# Patient Record
Sex: FEMALE | Race: WHITE
[De-identification: names, ages, dates, MRNs, and addresses within clinical notes are randomized per-mention and may not be internally consistent; named-entity substitution may affect disease eponyms.]

---

## 2018-07-05 ENCOUNTER — HOSPITAL ENCOUNTER (OUTPATIENT)
Dept: HOSPITAL 75 - RAD | Age: 28
End: 2018-07-05
Attending: NURSE PRACTITIONER
Payer: COMMERCIAL

## 2018-07-05 DIAGNOSIS — N94.10: Primary | ICD-10-CM

## 2018-07-05 DIAGNOSIS — R10.2: ICD-10-CM

## 2018-07-05 PROCEDURE — 76830 TRANSVAGINAL US NON-OB: CPT

## 2018-07-05 PROCEDURE — 76856 US EXAM PELVIC COMPLETE: CPT

## 2018-07-05 NOTE — DIAGNOSTIC IMAGING REPORT
PROCEDURE: US Non-ob pelvis comp/trans.



TECHNIQUE:  Multiple realtime grayscale images were obtained of

the pelvis in various projections endovaginally.



Transabdominal imaging was also performed.



INDICATION: Dyspareunia and pelvic pain.



The uterus measures 6.1 x 4.7 x 3.8 cm. The endometrium is 5 mm

in thickness. No uterine mass is detected. The right ovary

measures 4.6 x 2.3 x 1.8 cm and the left ovary measures 3.5 x 2.5

x 1.3 cm. Both ovaries contain multiple follicles. Largest on the

right measures 13 mm. There is blood flow to both ovaries. No

adnexal mass or free fluid is seen.



IMPRESSION: Essentially unremarkable transabdominal and

transvaginal pelvic ultrasound.



Dictated by: 



  Dictated on workstation # [id number]

## 2019-06-06 ENCOUNTER — HOSPITAL ENCOUNTER (OUTPATIENT)
Dept: HOSPITAL 75 - CARD | Age: 29
End: 2019-06-06
Attending: SURGERY
Payer: COMMERCIAL

## 2019-06-06 DIAGNOSIS — R10.13: Primary | ICD-10-CM

## 2019-06-06 DIAGNOSIS — R11.2: ICD-10-CM

## 2019-06-06 PROCEDURE — 78227 HEPATOBIL SYST IMAGE W/DRUG: CPT

## 2019-06-06 NOTE — DIAGNOSTIC IMAGING REPORT
INDICATION: Epigastric and abdominal pain.



COMPARISON: None.



Tc-99m Choletec  4.97 mCi IV followed by 8 ounces of oral Ensure.



FINDINGS: The upper abdomen was imaged for 60 minutes with the

gamma camera.  There is normal appearance of activity in the

liver. There is activity in the common duct and gallbladder by 60

minutes.  



After 60 minutes, the patient received 8 ounces of oral Ensure.

After 60 minutes of additional imaging, the gallbladder ejection

fraction was calculated to be 47% which is normal.



IMPRESSION: Normal hepatobiliary study with GBEF of 47%.



Dictated by: 



  Dictated on workstation # [id number]

## 2019-06-17 ENCOUNTER — HOSPITAL ENCOUNTER (OUTPATIENT)
Dept: HOSPITAL 75 - PREOP | Age: 29
Discharge: HOME | End: 2019-06-17
Attending: SURGERY
Payer: COMMERCIAL

## 2019-06-17 VITALS — HEIGHT: 64 IN | BODY MASS INDEX: 19.81 KG/M2 | WEIGHT: 116 LBS

## 2019-06-17 DIAGNOSIS — Z01.818: Primary | ICD-10-CM

## 2022-02-28 ENCOUNTER — HOSPITAL ENCOUNTER (EMERGENCY)
Dept: HOSPITAL 75 - ER | Age: 32
Discharge: HOME | End: 2022-02-28
Payer: COMMERCIAL

## 2022-02-28 VITALS — DIASTOLIC BLOOD PRESSURE: 64 MMHG | SYSTOLIC BLOOD PRESSURE: 115 MMHG

## 2022-02-28 VITALS — WEIGHT: 114.64 LBS | HEIGHT: 63.78 IN | BODY MASS INDEX: 19.81 KG/M2

## 2022-02-28 DIAGNOSIS — N20.1: Primary | ICD-10-CM

## 2022-02-28 LAB
APTT PPP: YELLOW S
BACTERIA #/AREA URNS HPF: NEGATIVE /HPF
BASOPHILS # BLD AUTO: 0 10^3/UL (ref 0–0.1)
BASOPHILS NFR BLD AUTO: 0 % (ref 0–10)
BILIRUB UR QL STRIP: NEGATIVE
BUN/CREAT SERPL: 8
CALCIUM SERPL-MCNC: 9.3 MG/DL (ref 8.5–10.1)
CHLORIDE SERPL-SCNC: 107 MMOL/L (ref 98–107)
CO2 SERPL-SCNC: 17 MMOL/L (ref 21–32)
CREAT SERPL-MCNC: 0.86 MG/DL (ref 0.6–1.3)
EOSINOPHIL # BLD AUTO: 0.1 10^3/UL (ref 0–0.3)
EOSINOPHIL NFR BLD AUTO: 1 % (ref 0–10)
FIBRINOGEN PPP-MCNC: CLEAR MG/DL
GFR SERPLBLD BASED ON 1.73 SQ M-ARVRAT: 94 ML/MIN
GLUCOSE SERPL-MCNC: 125 MG/DL (ref 70–105)
GLUCOSE UR STRIP-MCNC: NEGATIVE MG/DL
HCT VFR BLD CALC: 40 % (ref 35–52)
HGB BLD-MCNC: 13.3 G/DL (ref 11.5–16)
KETONES UR QL STRIP: (no result)
LEUKOCYTE ESTERASE UR QL STRIP: (no result)
LYMPHOCYTES # BLD AUTO: 2.4 10^3/UL (ref 1–4)
LYMPHOCYTES NFR BLD AUTO: 41 % (ref 12–44)
MANUAL DIFFERENTIAL PERFORMED BLD QL: NO
MCH RBC QN AUTO: 30 PG (ref 25–34)
MCHC RBC AUTO-ENTMCNC: 33 G/DL (ref 32–36)
MCV RBC AUTO: 89 FL (ref 80–99)
MONOCYTES # BLD AUTO: 0.4 10^3/UL (ref 0–1)
MONOCYTES NFR BLD AUTO: 6 % (ref 0–12)
NEUTROPHILS # BLD AUTO: 3.1 10^3/UL (ref 1.8–7.8)
NEUTROPHILS NFR BLD AUTO: 52 % (ref 42–75)
NITRITE UR QL STRIP: NEGATIVE
PH UR STRIP: 7 [PH] (ref 5–9)
PLATELET # BLD: 304 10^3/UL (ref 130–400)
PMV BLD AUTO: 9.9 FL (ref 9–12.2)
POTASSIUM SERPL-SCNC: 4.3 MMOL/L (ref 3.6–5)
PROT UR QL STRIP: (no result)
SODIUM SERPL-SCNC: 136 MMOL/L (ref 135–145)
SP GR UR STRIP: 1.02 (ref 1.02–1.02)
SQUAMOUS #/AREA URNS HPF: (no result) /HPF
WBC # BLD AUTO: 6 10^3/UL (ref 4.3–11)
WBC #/AREA URNS HPF: (no result) /HPF

## 2022-02-28 PROCEDURE — 80048 BASIC METABOLIC PNL TOTAL CA: CPT

## 2022-02-28 PROCEDURE — 74176 CT ABD & PELVIS W/O CONTRAST: CPT

## 2022-02-28 PROCEDURE — 96374 THER/PROPH/DIAG INJ IV PUSH: CPT

## 2022-02-28 PROCEDURE — 81000 URINALYSIS NONAUTO W/SCOPE: CPT

## 2022-02-28 PROCEDURE — 87591 N.GONORRHOEAE DNA AMP PROB: CPT

## 2022-02-28 PROCEDURE — 84703 CHORIONIC GONADOTROPIN ASSAY: CPT

## 2022-02-28 PROCEDURE — 36415 COLL VENOUS BLD VENIPUNCTURE: CPT

## 2022-02-28 PROCEDURE — 74018 RADEX ABDOMEN 1 VIEW: CPT

## 2022-02-28 PROCEDURE — 87491 CHLMYD TRACH DNA AMP PROBE: CPT

## 2022-02-28 PROCEDURE — 96375 TX/PRO/DX INJ NEW DRUG ADDON: CPT

## 2022-02-28 PROCEDURE — 85025 COMPLETE CBC W/AUTO DIFF WBC: CPT

## 2022-02-28 NOTE — ED ABDOMINAL PAIN
General


Chief Complaint:  Back Problems


Stated Complaint:  FEVER / CHILLS / N/V / BACK PAIN


Source of Information:  Patient


Exam Limitations:  No Limitations





History of Present Illness


Date Seen by Provider:  2022


Time Seen by Provider:  08:50


Initial Comments


Patient is a 31-year-old female who presents to the emergency department with a 

chief complaint of right flank pain, right mid abdominal pain, nausea vomiting, 

subjective fever onset 2 days ago on Saturday.  Patient woke up with her 

symptoms, went to urgent care and was told that she did not have a urinary tract

infection however they started her on some Macrobid.  They also tested her for 

bacterial vaginosis which she states she did not have.  Patient states her 

symptoms actually got better throughout Saturday evening but returned yesterday 

evening and have been severe.  She has had persistent nausea and vomiting with 

radiation of the pain from the right flank down into her groin and vaginal area.

 She denies abnormal vaginal discharge.  Recently finished up her menstrual 

cycle in the last couple of days.  Is on birth control.  No prior history of 

kidney stone.  Has had previous appendectomy.  No diarrhea.  Last bowel movement

was yesterday.  No black or bloody stools.





All other review of systems reviewed and negative except as stated.


Timing/Duration:  1-2 Days


Severity/Quality:  Aching, Cramping


Location:  RUQ, RLQ, Flank (right)


Radiation:  Groin


Associated Symptoms:  Nausea/Vomiting





Allergies and Home Medications


Allergies


Coded Allergies:  


     No Known Drug Allergies (Unverified , 14)





Patient Home Medication List


Home Medication List Reviewed:  Yes


Hydrocodone/Acetaminophen (Hydrocodone-Acetamin 5-325 mg) 1 Each Tablet, 1 TAB 

PO Q6H PRN for PAIN-MODERATE (5-7)


   Prescribed by: LISA ESQUEDA on 22 1031


Melatonin/Pyridoxine (Melatonin 5 mg Tablet) 1 Each Tablet, 5 MG PO HS, 

(Reported)


   Entered as Reported by: HOWARD REAGAN on 19 1316


Ondansetron (Ondansetron Odt) 4 Mg Tab.rapdis, 4 MG PO Q8H PRN for nausea


   Prescribed by: LISA ESQUEDA on 22 1030


Pantoprazole Sodium (Protonix) 40 Mg Tablet.dr, 40 MG PO DAILY


   Prescribed by: HARDEEP SELBY on 19 1559


Tamsulosin HCl (Flomax) 0.4 Mg Cap, 0.4 MG PO DAILY


   Prescribed by: LISA ESQUEDA on 22 1030


[bcp]  , (Reported)


   Entered as Reported by: SHALA BARAHONA on 16 0047





Review of Systems


Review of Systems


Constitutional:  see HPI


EENTM:  No Symptoms Reported


Respiratory:  No Symptoms Reported


Cardiovascular:  No Symptoms Reported


Gastrointestinal:  Abdominal Pain, Nausea, Vomiting


Genitourinary:  Pain (vaginal pain (had pain about 1 week ago as well on 

wednesday after intercourse))


Musculoskeletal:  no symptoms reported


Skin:  no symptoms reported


Psychiatric/Neurological:  No Symptoms Reported





All Other Systems Reviewed


Negative Unless Noted:  Yes





Past Medical-Social-Family Hx


Immunizations Up To Date


Tetanus Booster (TDap):  Unknown





Seasonal Allergies


Seasonal Allergies:  No





Past Medical History


Surgeries:  Yes (L KNEE ARTHOSCOPY)


Appendectomy, Tonsillectomy


Respiratory:  No


Cardiac:  No


Neurological:  No


Reproductive Disorders:  No


Genitourinary:  No


Gastrointestinal:  Yes


Gastroesophageal Reflux


Musculoskeletal:  No


Endocrine:  No


HEENT:  No


Cancer:  No


Psychosocial:  No


Integumentary:  No


Blood Disorders:  No





Family Medical History





Patient reports no known family medical history.





Physical Exam


Vital Signs





Vital Signs - First Documented








 22





 08:50


 


Temp 36.0


 


Pulse 88


 


Resp 16


 


B/P (MAP) 140/116 (124)


 


Pulse Ox 100





Capillary Refill :


Height/Weight/BMI


Height: 5'4.00"


Weight: 116lbs. 0.0oz. 52.850509st; 19.9 BMI


Method:Stated


General Appearance:  WD/WN, no apparent distress


HEENT:  PERRL/EOMI


Respiratory:  lungs clear, normal breath sounds, no respiratory distress, no 

accessory muscle use


Cardiovascular:  regular rate, rhythm


Gastrointestinal:  soft, tenderness (right flank, mild right CVA tenderness; no 

rebound/Pedraza's; no involuntary guarding)


Extremities:  normal range of motion, non-tender, normal inspection, no pedal 

edema


Back:  CVA tenderness (R)


Neurologic/Psychiatric:  alert, normal mood/affect, oriented x 3


Skin:  normal color, warm/dry





Progress/Results/Core Measures


Results/Orders


Lab Results





Laboratory Tests








Test


 22


08:59 22


09:05 Range/Units


 


 


White Blood Count


 6.0 


 


 4.3-11.0


10^3/uL


 


Red Blood Count


 4.50 


 


 3.80-5.11


10^6/uL


 


Hemoglobin 13.3   11.5-16.0  g/dL


 


Hematocrit 40   35-52  %


 


Mean Corpuscular Volume 89   80-99  fL


 


Mean Corpuscular Hemoglobin 30   25-34  pg


 


Mean Corpuscular Hemoglobin


Concent 33 


 


 32-36  g/dL





 


Red Cell Distribution Width 13.5   10.0-14.5  %


 


Platelet Count


 304 


 


 130-400


10^3/uL


 


Mean Platelet Volume 9.9   9.0-12.2  fL


 


Immature Granulocyte % (Auto) 0    %


 


Neutrophils (%) (Auto) 52   42-75  %


 


Lymphocytes (%) (Auto) 41   12-44  %


 


Monocytes (%) (Auto) 6   0-12  %


 


Eosinophils (%) (Auto) 1   0-10  %


 


Basophils (%) (Auto) 0   0-10  %


 


Neutrophils # (Auto)


 3.1 


 


 1.8-7.8


10^3/uL


 


Lymphocytes # (Auto)


 2.4 


 


 1.0-4.0


10^3/uL


 


Monocytes # (Auto)


 0.4 


 


 0.0-1.0


10^3/uL


 


Eosinophils # (Auto)


 0.1 


 


 0.0-0.3


10^3/uL


 


Basophils # (Auto)


 0.0 


 


 0.0-0.1


10^3/uL


 


Immature Granulocyte # (Auto)


 0.0 


 


 0.0-0.1


10^3/uL


 


Urine Color YELLOW    


 


Urine Clarity CLEAR    


 


Urine pH 7.0   5-9  


 


Urine Specific Gravity 1.020   1.016-1.022  


 


Urine Protein TRACE H  NEGATIVE  


 


Urine Glucose (UA) NEGATIVE   NEGATIVE  


 


Urine Ketones 1+ H  NEGATIVE  


 


Urine Nitrite NEGATIVE   NEGATIVE  


 


Urine Bilirubin NEGATIVE   NEGATIVE  


 


Urine Urobilinogen 0.2   < = 1.0  MG/DL


 


Urine Leukocyte Esterase TRACE H  NEGATIVE  


 


Urine RBC (Auto) 2+ H  NEGATIVE  


 


Urine RBC 10-25 H   /HPF


 


Urine WBC RARE    /HPF


 


Urine Squamous Epithelial


Cells 2-5 


 


  /HPF





 


Urine Crystals NONE    /LPF


 


Urine Bacteria NEGATIVE    /HPF


 


Urine Casts NONE    /LPF


 


Urine Mucus SMALL H   /LPF


 


Urine Culture Indicated NO    


 


Sodium Level 136   135-145  MMOL/L


 


Potassium Level 4.3   3.6-5.0  MMOL/L


 


Chloride Level 107     MMOL/L


 


Carbon Dioxide Level 17 L  21-32  MMOL/L


 


Anion Gap 12   5-14  MMOL/L


 


Blood Urea Nitrogen 7   7-18  MG/DL


 


Creatinine


 0.86 


 


 0.60-1.30


MG/DL


 


Estimat Glomerular Filtration


Rate 94 


 


  





 


BUN/Creatinine Ratio 8    


 


Glucose Level 125 H    MG/DL


 


Calcium Level 9.3   8.5-10.1  MG/DL








My Orders





Orders - LISA ESQUEDA MD


Ed Iv/Invasive Line Start (22 08:56)


Cbc With Automated Diff (22 08:56)


Basic Metabolic Panel (22 08:56)


Urine Pregnancy Bedside (22 08:56)


Ua Culture If Indicated (22 08:56)


Abdomen/Kub 1view (22 08:56)


Ct Abd/Pelvis Wo(Kidney Stone) (22 08:56)


Ketorolac Injection (Toradol Injection) (22 09:00)


Neis Luis Alfredo Dna Urine Test (22 09:01)


Chlamydia Trachomatis Urine (22 09:01)


Ondansetron Injection (Zofran Injectio (22 09:30)





Medications Given in ED





Current Medications








 Medications  Dose


 Ordered  Sig/Vikas


 Route  Start Time


 Stop Time Status Last Admin


Dose Admin


 


 Ketorolac


 Tromethamine  15 mg  ONCE  ONCE


 IVP  22 09:00


 22 09:01 DC 22 09:12


15 MG


 


 Ondansetron HCl  8 mg  ONCE  ONCE


 IVP  22 09:30


 22 09:31 DC 22 09:21


8 MG








Vital Signs/I&O











 22





 08:50


 


Temp 36.0


 


Pulse 88


 


Resp 16


 


B/P (MAP) 140/116 (124)


 


Pulse Ox 100











Progress


Progress Note :  


   Time:  10:27


Progress Note


Patient states she feels much better after IV Toradol.  She has a 3 mm distal 

right ureteral stone.  She does not have evidence of urinary tract infection on 

urinalysis.  Renal function is normal.  I did recommend the patient complete her

 course of antibiotics.  I have given her return precautions.  She verbalized 

understanding, is comfortable with the plan of care.  All questions have been 

sought and answered.





Diagnostic Imaging





   Diagonstic Imaging:  Xray


Comments


                 [facility] [place], [place]





NAME:   SCOTT HEDRICK


MED REC#:   [id number]


ACCOUNT#:   [id number]


PT STATUS:   REG ER


:   1990


PHYSICIAN:   LISA ESQUEDA MD


ADMIT DATE:   22/ER


                                   ***Draft***


Date of Exam:22





ABDOMEN/KUB 1VIEW








EXAMINATION: Abdomen 1 view





HISTORY: Right flank pain





COMPARISON: 2022





FINDINGS: 





There is a moderate amount of gas and stool throughout the colon.


Nonobstructive bowel gas pattern. Opacity within the left pelvis


measuring up to 0.3 cm. The osseous structures are intact.





IMPRESSION:





No acute radiographic abnormality in the visualized abdomen. A


0.3 cm opacity within the left pelvis which could represent stone


or phlebolith.





  Dictated on workstation # [id number]








Dict:   22 1002


Trans:   22 1004


CVB [id number]





Interpreted by:     APARNA GALLEGOS DO


Electronically signed by:








Comments


                 [facility] [place], [place]





NAME:   SCOTT HEDRICK


MED REC#:   [id number]


ACCOUNT#:   [id number]


PT STATUS:   REG ER


:   1990


PHYSICIAN:   LISA ESQUEDA MD


ADMIT DATE:   22/ER


                                   ***Draft***


Date of Exam:22





CT ABD/PELVIS WO(KIDNEY STONE)








INDICATION:  


Low abdominal pain.





TECHNIQUE: 


Noncontrast CT imaging of the abdomen and pelvis was performed.


Comparison is made to the study of 2016.





FINDINGS:


The unenhanced images of the liver, gallbladder, pancreas,


adrenal glands, and spleen are unremarkable. There are numerous


punctate nonobstructing stones in the kidneys bilaterally. There


is mild prominence of the right renal collecting system and


ureter to the level of an approximately 0.3 cm stone in the


distal right ureter. No bladder calculus is identified. There is


no evidence of free fluid or organized inflammation. The appendix


is surgically absent.





IMPRESSION: 


Bilateral nephrolithiasis with an approximately 0.3 cm at least


partially obstructing calculus in the distal right ureter.














  Dictated on workstation # [id number]








Dict:   22 0955


Trans:   22 1008


JM [id number]





Interpreted by:     BLAINE GASTON MD


Electronically signed by:





Departure


Impression





   Primary Impression:  


   Ureterolithiasis


Disposition:  01 HOME, SELF-CARE


Condition:  Improved





Departure-Patient Inst.


Decision time for Depature:  10:28


Referrals:  


[facility] (PCP/Family)


Primary Care Physician








TAWIL,ELIAS A MD


Patient Instructions:  Kidney Stone, Adult ED





Add. Discharge Instructions:  


Drink plenty of fluids to stay well-hydrated.





Strain your urine until you note that you have passed your stone.





You can continue to take over-the-counter ibuprofen, 3 tablets which is 600 mg 

every 6 hours as needed for pain.  Or you can take Alleve 2 tablets in the 

morning with food and 2 tablets in the evening with food as needed for pain.





I have written you a prescription for hydrocodone.  You can take 1 of these 

every 6 hours as needed for pain.  Hydrocodone is a narcotic, you should not 

drive and take this medication.  Narcotics can cause constipation.  While you 

are taking them you should be on a stool softener daily.





I have sent you a prescription for nausea medicine that you can take every 8 

hours.





I have also sent you a prescription for Flomax which will help with urinary 

flow.





Please finish up your antibiotic prescription.





Come back to the emergency room for any fever, worsening pain or new emergent 

concerning symptoms.





I have also given you contact information for our urologist on-call, Dr. Tawil. 

 You can call his office for follow-up regarding further evaluation and man

agement of kidney stones.


Scripts


Ondansetron (Ondansetron Odt) 4 Mg Tab.rapdis


4 MG PO Q8H PRN for nausea, #15 TAB


   Prov: LISA ESQUEDA MD         22 


Hydrocodone/Acetaminophen (Hydrocodone-Acetamin 5-325 mg) 1 Each Tablet


1 TAB PO Q6H PRN for PAIN-MODERATE (5-7), #12 TAB


   Prov: LISA ESQUEDA MD         22 


Tamsulosin HCl (Flomax) 0.4 Mg Cap


0.4 MG PO DAILY for 14 Days, #14 CAP


   Prov: LISA ESQUEDA MD         22











LISA ESQUEDA MD         2022 09:01

## 2022-02-28 NOTE — DIAGNOSTIC IMAGING REPORT
EXAMINATION: Abdomen 1 view



HISTORY: Right flank pain



COMPARISON: 02/28/2022



FINDINGS: 



There is a moderate amount of gas and stool throughout the colon.

Nonobstructive bowel gas pattern. Opacity within the left pelvis

measuring up to 0.3 cm. The osseous structures are intact.



IMPRESSION:



No acute radiographic abnormality in the visualized abdomen. A

0.3 cm opacity within the left pelvis which could represent stone

or phlebolith.



Dictated by: 



  Dictated on workstation # [id number]

## 2022-02-28 NOTE — DIAGNOSTIC IMAGING REPORT
INDICATION:  

Low abdominal pain.



TECHNIQUE: 

Noncontrast CT imaging of the abdomen and pelvis was performed.

Comparison is made to the study of 11/02/2016.



FINDINGS:

The unenhanced images of the liver, gallbladder, pancreas,

adrenal glands, and spleen are unremarkable. There are numerous

punctate nonobstructing stones in the kidneys bilaterally. There

is mild prominence of the right renal collecting system and

ureter to the level of an approximately 0.3 cm stone in the

distal right ureter. No bladder calculus is identified. There is

no evidence of free fluid or organized inflammation. The appendix

is surgically absent.



IMPRESSION: 

Bilateral nephrolithiasis with an approximately 0.3 cm at least

partially obstructing calculus in the distal right ureter.







Dictated by: 



  Dictated on workstation # [id number]

## 2022-03-01 ENCOUNTER — HOSPITAL ENCOUNTER (OUTPATIENT)
Dept: HOSPITAL 75 - RAD | Age: 32
End: 2022-03-01
Attending: UROLOGY
Payer: COMMERCIAL

## 2022-03-01 DIAGNOSIS — N20.0: Primary | ICD-10-CM

## 2022-03-01 PROCEDURE — 74018 RADEX ABDOMEN 1 VIEW: CPT

## 2022-03-01 NOTE — DIAGNOSTIC IMAGING REPORT
INDICATION: Right ureteral stone.



TIME OF EXAM: 01:27 p.m.



COMPARISON: Correlation is made with prior radiograph from

earlier same day.



FINDINGS: The tiny stone in the region of the distal right ureter

is not well visualized by plain film radiographs. Punctate

densities are noted overlying the right renal shadow, consistent

with nephrolithiasis. Left renal shadow is somewhat obscured by

bowel gas. No definite calculi along the course of ureters are

seen.



IMPRESSION: Right-sided renal calculi. No definite calculi within

the course of ureters are identified.



Dictated by: 



  Dictated on workstation # [id number]

## 2022-03-07 ENCOUNTER — HOSPITAL ENCOUNTER (OUTPATIENT)
Dept: HOSPITAL 75 - PREOP | Age: 32
Discharge: HOME | End: 2022-03-07
Attending: UROLOGY
Payer: COMMERCIAL

## 2022-03-07 VITALS — HEIGHT: 64.02 IN | WEIGHT: 115.3 LBS | BODY MASS INDEX: 19.68 KG/M2

## 2022-03-07 DIAGNOSIS — Z01.818: Primary | ICD-10-CM

## 2022-03-08 ENCOUNTER — HOSPITAL ENCOUNTER (OUTPATIENT)
Dept: HOSPITAL 75 - SDC | Age: 32
Discharge: HOME | End: 2022-03-08
Attending: UROLOGY
Payer: COMMERCIAL

## 2022-03-08 VITALS — WEIGHT: 115.3 LBS | HEIGHT: 63.78 IN | BODY MASS INDEX: 19.93 KG/M2

## 2022-03-08 VITALS — SYSTOLIC BLOOD PRESSURE: 112 MMHG | DIASTOLIC BLOOD PRESSURE: 76 MMHG

## 2022-03-08 VITALS — DIASTOLIC BLOOD PRESSURE: 74 MMHG | SYSTOLIC BLOOD PRESSURE: 109 MMHG

## 2022-03-08 VITALS — SYSTOLIC BLOOD PRESSURE: 114 MMHG | DIASTOLIC BLOOD PRESSURE: 81 MMHG

## 2022-03-08 VITALS — SYSTOLIC BLOOD PRESSURE: 119 MMHG | DIASTOLIC BLOOD PRESSURE: 78 MMHG

## 2022-03-08 VITALS — DIASTOLIC BLOOD PRESSURE: 67 MMHG | SYSTOLIC BLOOD PRESSURE: 113 MMHG

## 2022-03-08 VITALS — DIASTOLIC BLOOD PRESSURE: 74 MMHG | SYSTOLIC BLOOD PRESSURE: 123 MMHG

## 2022-03-08 VITALS — DIASTOLIC BLOOD PRESSURE: 74 MMHG | SYSTOLIC BLOOD PRESSURE: 112 MMHG

## 2022-03-08 VITALS — DIASTOLIC BLOOD PRESSURE: 79 MMHG | SYSTOLIC BLOOD PRESSURE: 121 MMHG

## 2022-03-08 VITALS — SYSTOLIC BLOOD PRESSURE: 116 MMHG | DIASTOLIC BLOOD PRESSURE: 73 MMHG

## 2022-03-08 DIAGNOSIS — N20.2: Primary | ICD-10-CM

## 2022-03-08 DIAGNOSIS — F17.210: ICD-10-CM

## 2022-03-08 PROCEDURE — 76000 FLUOROSCOPY <1 HR PHYS/QHP: CPT

## 2022-03-08 PROCEDURE — 74018 RADEX ABDOMEN 1 VIEW: CPT

## 2022-03-08 PROCEDURE — 84703 CHORIONIC GONADOTROPIN ASSAY: CPT

## 2022-03-08 PROCEDURE — 87081 CULTURE SCREEN ONLY: CPT

## 2022-03-08 RX ADMIN — SODIUM CHLORIDE, SODIUM LACTATE, POTASSIUM CHLORIDE, AND CALCIUM CHLORIDE PRN MLS/HR: 600; 310; 30; 20 INJECTION, SOLUTION INTRAVENOUS at 06:50

## 2022-03-08 RX ADMIN — SODIUM CHLORIDE, SODIUM LACTATE, POTASSIUM CHLORIDE, AND CALCIUM CHLORIDE PRN MLS/HR: 600; 310; 30; 20 INJECTION, SOLUTION INTRAVENOUS at 07:30

## 2022-03-08 NOTE — DISCHARGE INST-UROLOGY
Discharge Inst-Urology


Reconcile Patient Problems


Problems Reviewed?:  Yes


Final Diagnosis


RT DISTAL URETERAL STONE





Patient Instructions/Follow Up


Plan/Assessment/Instructions


Please make appointment to been seen in office in 3 weeks.





Increase oral fluids for 48 hours and then as needed.





Diet and Activity as tolerated.





If questions or concerns contact your physician 


Or seek help at emergency department.











TAWIL,ELIAS A MD                Mar 8, 2022 07:14

## 2022-03-08 NOTE — PROGRESS NOTE-PRE OPERATIVE
Pre-Operative Progress Note


H&P Reviewed


The H&P was reviewed, patient examined and no changes noted.


Date Seen by Provider:  Mar 8, 2022


Time Seen by Provider:  07:05


Date H&P Reviewed:  Mar 8, 2022


Time H&P Reviewed:  07:05


Pre-Operative Diagnosis:  RT DISTAL URETERAL STONE











TAWIL,ELIAS A MD                Mar 8, 2022 07:05

## 2022-03-08 NOTE — DIAGNOSTIC IMAGING REPORT
ABDOMEN/KUB 1VIEW



INDICATION: Right-sided ureteral stone



COMPARISON: 03/01/2022



FINDINGS: There remain a few punctate right-sided renal calculi.

Again, no mineralizations are seen along the course of the

ureters. Nonobstructive bowel gas pattern. Stable regional

skeleton.



IMPRESSION: No change in multiple right-sided punctate renal

calculi. Again, no calculus are seen along the course of the

right ureter.



Dictated by: 



  Dictated on workstation # [id number]

## 2022-03-08 NOTE — OPERATIVE REPORT
DATE OF SERVICE:  03/08/2022



PREOPERATIVE DIAGNOSIS:

Right distal ureteral stone.



POSTOPERATIVE DIAGNOSIS:

Right distal ureteral stone.



OPERATION PERFORMED:

Right ureteroscopy with stone lithotripsy and basket.



SURGEON:

Elias Tawil, MD



ANESTHESIA:

General.



COMPLICATIONS:

None.



DESCRIPTION OF PROCEDURE:

Under satisfactory general anesthesia, the patient in lithotomy position,

genitalia were prepped and draped in the usual sterile fashion.  Cystoscopy was

performed that was normal except that the right ureteral orifice intramural

portion was edematous and hyperemic.  Using the foroblique lens, I dilated the

right ureteral orifice intramural portion to accommodate a 6.9 Ukrainian semi-rigid

ureteroscope.  I visualized the stone, which I had disimpacted during dilatation

apparently and broke some of it.  I passed a Orozco basket 3-Ukrainian size.  It

fragmented the stone further and pulled it into the bladder.  I went back with

ureteroscope to the proximal ureter and antegrade and retrograde to confirm no

more fragments or stones.  I removed the ureteroscope, reinserted the cystoscope

to empty the bladder.  The patient tolerated the procedure and anesthesia well

and was sent to recovery room in stable condition.



CC:  [facility] - requested, unable to deliver.





Job ID: [id number]

DocumentID: [id number]

Dictated Date:  03/08/2022 07:57:18

Transcription Date: 03/08/2022 14:17:49

Dictated By: ELIAS TAWIL, MD

## 2022-03-08 NOTE — ANESTHESIA-GENERAL POST-OP
General


Patient Condition


Mental Status/LOC:  Same as Preop


Cardiovascular:  Satisfactory


Nausea/Vomiting:  Absent


Respiratory:  Satisfactory


Pain:  Controlled


Complications:  Absent





Post Op Complications


Complications


None





Follow Up Care/Instructions


Patient Instructions


None needed.





Anesthesia/Patient Condition


Patient Condition


Patient is doing well, no complaints, stable vital signs, no apparent adverse 

anesthesia problems.   


No complications reported per nursing.


D/C home per [facility] Criteria:  Yes











CARMEN TERRELL CRNA            Mar 8, 2022 13:53

## 2022-03-29 ENCOUNTER — HOSPITAL ENCOUNTER (OUTPATIENT)
Dept: HOSPITAL 75 - LAB | Age: 32
End: 2022-03-29
Attending: UROLOGY
Payer: COMMERCIAL

## 2022-03-29 DIAGNOSIS — N20.9: Primary | ICD-10-CM

## 2022-03-29 LAB
BUN/CREAT SERPL: 15
CALCIUM SERPL-MCNC: 9.4 MG/DL (ref 8.5–10.1)
CHLORIDE SERPL-SCNC: 105 MMOL/L (ref 98–107)
CO2 SERPL-SCNC: 21 MMOL/L (ref 21–32)
CREAT SERPL-MCNC: 0.75 MG/DL (ref 0.6–1.3)
GFR SERPLBLD BASED ON 1.73 SQ M-ARVRAT: 110 ML/MIN
GLUCOSE SERPL-MCNC: 84 MG/DL (ref 70–105)
PHOSPHATE SERPL-MCNC: 3.4 MG/DL (ref 2.3–4.7)
POTASSIUM SERPL-SCNC: 4 MMOL/L (ref 3.6–5)
SODIUM SERPL-SCNC: 139 MMOL/L (ref 135–145)
URATE SERPL-MCNC: 5.2 MG/DL (ref 2.6–7.2)

## 2022-03-29 PROCEDURE — 84100 ASSAY OF PHOSPHORUS: CPT

## 2022-03-29 PROCEDURE — 80048 BASIC METABOLIC PNL TOTAL CA: CPT

## 2022-03-29 PROCEDURE — 84550 ASSAY OF BLOOD/URIC ACID: CPT

## 2022-03-29 PROCEDURE — 36415 COLL VENOUS BLD VENIPUNCTURE: CPT

## 2022-03-29 PROCEDURE — 83970 ASSAY OF PARATHORMONE: CPT

## 2022-04-29 ENCOUNTER — HOSPITAL ENCOUNTER (OUTPATIENT)
Dept: HOSPITAL 75 - LAB | Age: 32
LOS: 1 days | End: 2022-04-30
Attending: UROLOGY
Payer: COMMERCIAL

## 2022-04-29 DIAGNOSIS — N20.9: Primary | ICD-10-CM

## 2022-04-29 PROCEDURE — 84300 ASSAY OF URINE SODIUM: CPT

## 2022-04-29 PROCEDURE — 36415 COLL VENOUS BLD VENIPUNCTURE: CPT

## 2022-04-29 PROCEDURE — 84392 ASSAY OF URINE SULFATE: CPT

## 2022-04-29 PROCEDURE — 84133 ASSAY OF URINE POTASSIUM: CPT

## 2022-04-29 PROCEDURE — 82507 ASSAY OF CITRATE: CPT

## 2022-04-29 PROCEDURE — 83945 ASSAY OF OXALATE: CPT

## 2022-04-29 PROCEDURE — 84560 ASSAY OF URINE/URIC ACID: CPT

## 2022-04-29 PROCEDURE — 82140 ASSAY OF AMMONIA: CPT

## 2022-04-29 PROCEDURE — 83735 ASSAY OF MAGNESIUM: CPT

## 2022-04-29 PROCEDURE — 83986 ASSAY PH BODY FLUID NOS: CPT

## 2022-04-29 PROCEDURE — 84105 ASSAY OF URINE PHOSPHORUS: CPT

## 2022-04-29 PROCEDURE — 82570 ASSAY OF URINE CREATININE: CPT

## 2022-04-29 PROCEDURE — 82340 ASSAY OF CALCIUM IN URINE: CPT

## 2022-05-07 NOTE — PROGRESS NOTE-POST OPERATIVE
Post-Operative Progess Note


Surgeon (s)/Assistant (s)


Surgeon


ELIAS TAWIL MD


Assistant:  NONE





Pre-Operative Diagnosis


RT DISTAL URETERAL STONE





Post-Operative Diagnosis





SAME





Procedure & Operative Findings


Date of Procedure


3/8/22


Procedure Performed/Findings


RT URETEROSCOPY WITH STONE FRAGMENTATION AND BASKET


Anesthesia Type


GENERAL





Estimated Blood Loss


Estimated blood loss (mL):  NONE





Specimens/Packing


Specimens Removed


NONE


Packing:  


NONE











TAWIL,ELIAS A MD                Mar 8, 2022 07:13
No